# Patient Record
Sex: FEMALE | Race: WHITE | Employment: OTHER | URBAN - METROPOLITAN AREA
[De-identification: names, ages, dates, MRNs, and addresses within clinical notes are randomized per-mention and may not be internally consistent; named-entity substitution may affect disease eponyms.]

---

## 2017-02-06 ENCOUNTER — OFFICE VISIT (OUTPATIENT)
Dept: CARDIOLOGY CLINIC | Age: 82
End: 2017-02-06

## 2017-02-06 VITALS
SYSTOLIC BLOOD PRESSURE: 130 MMHG | HEIGHT: 62 IN | HEART RATE: 52 BPM | OXYGEN SATURATION: 95 % | BODY MASS INDEX: 42.42 KG/M2 | DIASTOLIC BLOOD PRESSURE: 80 MMHG | WEIGHT: 230.5 LBS

## 2017-02-06 DIAGNOSIS — I25.10 CORONARY ARTERY DISEASE DUE TO LIPID RICH PLAQUE: Primary | ICD-10-CM

## 2017-02-06 DIAGNOSIS — Z95.1 S/P CABG X 3: ICD-10-CM

## 2017-02-06 DIAGNOSIS — I65.23 BILATERAL CAROTID ARTERY STENOSIS: ICD-10-CM

## 2017-02-06 DIAGNOSIS — E78.49 OTHER HYPERLIPIDEMIA: ICD-10-CM

## 2017-02-06 DIAGNOSIS — I25.83 CORONARY ARTERY DISEASE DUE TO LIPID RICH PLAQUE: Primary | ICD-10-CM

## 2017-02-06 PROCEDURE — 99214 OFFICE O/P EST MOD 30 MIN: CPT | Performed by: INTERNAL MEDICINE

## 2017-02-06 RX ORDER — LISINOPRIL 20 MG/1
20 TABLET ORAL DAILY
COMMUNITY
Start: 2017-01-26 | End: 2017-02-06 | Stop reason: SDUPTHER

## 2017-02-06 RX ORDER — LEVOTHYROXINE SODIUM 0.12 MG/1
125 TABLET ORAL DAILY
COMMUNITY
Start: 2017-01-12

## 2017-02-07 RX ORDER — LISINOPRIL 20 MG/1
20 TABLET ORAL DAILY
Qty: 90 TABLET | Refills: 3 | Status: SHIPPED | OUTPATIENT
Start: 2017-02-07 | End: 2018-02-13 | Stop reason: SDUPTHER

## 2017-08-18 ENCOUNTER — OFFICE VISIT (OUTPATIENT)
Dept: CARDIOLOGY CLINIC | Age: 82
End: 2017-08-18

## 2017-08-18 VITALS
DIASTOLIC BLOOD PRESSURE: 70 MMHG | BODY MASS INDEX: 42.88 KG/M2 | SYSTOLIC BLOOD PRESSURE: 138 MMHG | WEIGHT: 233 LBS | HEART RATE: 56 BPM | HEIGHT: 62 IN

## 2017-08-18 DIAGNOSIS — Z95.1 S/P CABG X 3: ICD-10-CM

## 2017-08-18 DIAGNOSIS — E78.5 HYPERLIPIDEMIA, UNSPECIFIED HYPERLIPIDEMIA TYPE: Chronic | ICD-10-CM

## 2017-08-18 DIAGNOSIS — I25.10 CORONARY ARTERY DISEASE DUE TO LIPID RICH PLAQUE: Primary | ICD-10-CM

## 2017-08-18 DIAGNOSIS — R42 DIZZINESS: ICD-10-CM

## 2017-08-18 DIAGNOSIS — I65.23 BILATERAL CAROTID ARTERY STENOSIS: Chronic | ICD-10-CM

## 2017-08-18 DIAGNOSIS — I25.83 CORONARY ARTERY DISEASE DUE TO LIPID RICH PLAQUE: Primary | ICD-10-CM

## 2017-08-18 PROCEDURE — 99213 OFFICE O/P EST LOW 20 MIN: CPT | Performed by: INTERNAL MEDICINE

## 2017-11-13 RX ORDER — PRAVASTATIN SODIUM 40 MG
40 TABLET ORAL DAILY
Qty: 90 TABLET | Refills: 3 | Status: SHIPPED | OUTPATIENT
Start: 2017-11-13 | End: 2018-11-25 | Stop reason: SDUPTHER

## 2018-02-13 RX ORDER — LISINOPRIL 20 MG/1
20 TABLET ORAL DAILY
Qty: 90 TABLET | Refills: 4 | Status: SHIPPED | OUTPATIENT
Start: 2018-02-13 | End: 2018-08-09 | Stop reason: SDUPTHER

## 2018-08-06 PROBLEM — I10 ESSENTIAL HYPERTENSION: Status: ACTIVE | Noted: 2018-08-06

## 2018-08-07 NOTE — PROGRESS NOTES
[address]   Cardiac Followup    Referring Provider:  Mirian Metz MD     Chief Complaint   Patient presents with    Coronary Artery Disease    Chest Pain     Pt states she has had CP on and off for past few months. She hasn't had it lately. History of Present Illness:  Ms. Precious Vaz is a 81 y. o.female being seen in follow up for CAD s/p CABG 9/19/16. (LIMA to LAD, SVG to OM, and SVG to PDA). Additional history includes hypertension, hyperlidemia, and carotid stenosis. She had MRA of the neck that revealed 80% JAVIER. In December, 2013 a carotid doppler revealed 50-70% JAVIER. Today, she admits she is not as active due to balance issues, hip pain,  and the fact that her friend passed away who she did activities with. She walks with a cane. She also has light headedness with change in position. She had a few episodes of mid-sternal to left sided chest discomfort which radiated to back, non-exertional, no associated symptoms, often occurs after eating. Her biggest problem is fatigue. She gets short of breath when walking longer distances. The farthest she walks is to her barn. Past Medical History:   has a past medical history of Arthritis; Blood transfusion reaction; Carotid artery stenosis; Coronary artery disease due to lipid rich plaque; Hyperlipidemia; Hypertension; Nausea & vomiting; and Thyroid disease. Surgical History:   has a past surgical history that includes Tonsillectomy; hernia repair; Tubal ligation; Carotid endarterectomy (Right, 07/15/2014); Hysterectomy; Total knee arthroplasty (Bilateral); Total hip arthroplasty (Right); Cardiac catheterization (09/16/2016); and Coronary artery bypass graft (09/19/2016). Social History:   reports that she has never smoked. She has never used smokeless tobacco. She reports that she does not drink alcohol. Family History:  family history includes Heart Disease in her brother and mother.      Home Medications:  Prior to Admission medications    Medication Sig Start Date End Date Taking? Authorizing Provider   atenolol (TENORMIN) 25 MG tablet Take 25 mg by mouth daily   Yes Historical Provider, MD   aspirin 81 MG tablet Take 81 mg by mouth daily   Yes Historical Provider, MD   lisinopril (PRINIVIL;ZESTRIL) 10 MG tablet Take 1 tablet by mouth daily 8/9/18  Yes Sirisha Sandhu MD   pravastatin (PRAVACHOL) 40 MG tablet TAKE 1 TABLET BY MOUTH  DAILY 11/13/17  Yes Sirisha Sandhu MD   levothyroxine (SYNTHROID) 125 MCG tablet Take 125 mcg by mouth daily  1/12/17  Yes Historical Provider, MD   docusate sodium (COLACE, DULCOLAX) 100 MG CAPS Take 100 mg by mouth 2 times daily  Patient taking differently: Take 100 mg by mouth daily  9/23/16  Yes ISIDORO Wu CNP   furosemide (LASIX) 40 MG tablet Take 1 tablet by mouth daily. 7/8/14  Yes Melani Freire MD      Allergies:  Patient has no known allergies. Review of Systems:   A complete review of systems has been reviewed and updated today and is negative except as noted in the history of present illness. Physical Examination:    Vitals:    08/09/18 1010   BP: 126/72   Pulse: 62     Constitutional and General Appearance: NAD   Respiratory:  · Normal excursion and expansion without use of accessory muscles  · Resp Auscultation: Normal breath sounds without dullness  Cardiovascular:  · The apical impulses not displaced  · JVD is normal  · The carotid upstroke is normal in amplitude and contour without delay or bruit  · Normal S1S2, No S3, No Murmur  · Peripheral pulses are symmetrical and full  · There is no clubbing, cyanosis of the extremities. · Trace LE edema sukuamr.   · Pedal Pulses: 2+ and equal   Abdomen:  · No masses or tenderness  · Liver/Spleen: No Abnormalities Noted  Neurological/Psychiatric:  · Alert and oriented in all spheres  · Moves all extremities well  · Exhibits normal gait balance and coordination  · No abnormalities of mood, affect, memory, mentation, or

## 2018-08-09 ENCOUNTER — OFFICE VISIT (OUTPATIENT)
Dept: CARDIOLOGY CLINIC | Age: 83
End: 2018-08-09

## 2018-08-09 VITALS
WEIGHT: 234 LBS | SYSTOLIC BLOOD PRESSURE: 126 MMHG | HEART RATE: 62 BPM | HEIGHT: 62 IN | DIASTOLIC BLOOD PRESSURE: 72 MMHG | BODY MASS INDEX: 43.06 KG/M2

## 2018-08-09 DIAGNOSIS — I25.83 CORONARY ARTERY DISEASE DUE TO LIPID RICH PLAQUE: Primary | ICD-10-CM

## 2018-08-09 DIAGNOSIS — Z95.1 S/P CABG X 3: ICD-10-CM

## 2018-08-09 DIAGNOSIS — I25.10 CORONARY ARTERY DISEASE DUE TO LIPID RICH PLAQUE: Primary | ICD-10-CM

## 2018-08-09 DIAGNOSIS — I10 ESSENTIAL HYPERTENSION: ICD-10-CM

## 2018-08-09 DIAGNOSIS — I65.23 BILATERAL CAROTID ARTERY STENOSIS: Chronic | ICD-10-CM

## 2018-08-09 DIAGNOSIS — E78.5 DYSLIPIDEMIA: ICD-10-CM

## 2018-08-09 PROCEDURE — 1090F PRES/ABSN URINE INCON ASSESS: CPT | Performed by: INTERNAL MEDICINE

## 2018-08-09 PROCEDURE — 1123F ACP DISCUSS/DSCN MKR DOCD: CPT | Performed by: INTERNAL MEDICINE

## 2018-08-09 PROCEDURE — 1101F PT FALLS ASSESS-DOCD LE1/YR: CPT | Performed by: INTERNAL MEDICINE

## 2018-08-09 PROCEDURE — 99214 OFFICE O/P EST MOD 30 MIN: CPT | Performed by: INTERNAL MEDICINE

## 2018-08-09 PROCEDURE — 1036F TOBACCO NON-USER: CPT | Performed by: INTERNAL MEDICINE

## 2018-08-09 PROCEDURE — G8400 PT W/DXA NO RESULTS DOC: HCPCS | Performed by: INTERNAL MEDICINE

## 2018-08-09 PROCEDURE — G8427 DOCREV CUR MEDS BY ELIG CLIN: HCPCS | Performed by: INTERNAL MEDICINE

## 2018-08-09 PROCEDURE — 4040F PNEUMOC VAC/ADMIN/RCVD: CPT | Performed by: INTERNAL MEDICINE

## 2018-08-09 PROCEDURE — G8598 ASA/ANTIPLAT THER USED: HCPCS | Performed by: INTERNAL MEDICINE

## 2018-08-09 PROCEDURE — G8417 CALC BMI ABV UP PARAM F/U: HCPCS | Performed by: INTERNAL MEDICINE

## 2018-08-09 RX ORDER — LISINOPRIL 10 MG/1
10 TABLET ORAL DAILY
Qty: 30 TABLET | Refills: 11
Start: 2018-08-09 | End: 2019-06-18

## 2018-08-09 RX ORDER — ATENOLOL 25 MG/1
50 TABLET ORAL DAILY
COMMUNITY
End: 2020-07-10

## 2018-08-09 NOTE — LETTER
Tubal ligation; Carotid endarterectomy (Right, 07/15/2014); Hysterectomy; Total knee arthroplasty (Bilateral); Total hip arthroplasty (Right); Cardiac catheterization (09/16/2016); and Coronary artery bypass graft (09/19/2016). Social History:   reports that she has never smoked. She has never used smokeless tobacco. She reports that she does not drink alcohol. Family History:  family history includes Heart Disease in her brother and mother. Home Medications:  Prior to Admission medications    Medication Sig Start Date End Date Taking? Authorizing Provider   atenolol (TENORMIN) 25 MG tablet Take 25 mg by mouth daily   Yes Historical Provider, MD   aspirin 81 MG tablet Take 81 mg by mouth daily   Yes Historical Provider, MD   lisinopril (PRINIVIL;ZESTRIL) 10 MG tablet Take 1 tablet by mouth daily 8/9/18  Yes Dean Teixeira MD   pravastatin (PRAVACHOL) 40 MG tablet TAKE 1 TABLET BY MOUTH  DAILY 11/13/17  Yes Dean Teixeira MD   levothyroxine (SYNTHROID) 125 MCG tablet Take 125 mcg by mouth daily  1/12/17  Yes Historical Provider, MD   docusate sodium (COLACE, DULCOLAX) 100 MG CAPS Take 100 mg by mouth 2 times daily  Patient taking differently: Take 100 mg by mouth daily  9/23/16  Yes ISIDORO Mcgill CNP   furosemide (LASIX) 40 MG tablet Take 1 tablet by mouth daily. 7/8/14  Yes Dylan Hamilton MD      Allergies:  Patient has no known allergies. Review of Systems:   A complete review of systems has been reviewed and updated today and is negative except as noted in the history of present illness.       Physical Examination:    Vitals:    08/09/18 1010   BP: 126/72   Pulse: 62     Constitutional and General Appearance: NAD   Respiratory:  · Normal excursion and expansion without use of accessory muscles  · Resp Auscultation: Normal breath sounds without dullness  Cardiovascular:  · The apical impulses not displaced  · JVD is normal · The carotid upstroke is normal in amplitude and contour without delay or bruit  · Normal S1S2, No S3, No Murmur  · Peripheral pulses are symmetrical and full  · There is no clubbing, cyanosis of the extremities. · Trace LE edema sukumar. · Pedal Pulses: 2+ and equal   Abdomen:  · No masses or tenderness  · Liver/Spleen: No Abnormalities Noted  Neurological/Psychiatric:  · Alert and oriented in all spheres  · Moves all extremities well  · Exhibits normal gait balance and coordination  · No abnormalities of mood, affect, memory, mentation, or behavior are noted      Carotid ultrasound 10/24/17:  Conclusions:  Estimated diameter reduction of the right internal carotid artery and  bulb  is <50%. Estimated diameter reduction of the left internal carotid artery is 50  -69%. Antegrade right vertebral artery flow. Antegrade left vertebral artery  flow. Degree of stenosis derived using validated velocity measurements  correlated with NASCET validated angiographic criteria. CAB16:  Coronary artery bypass grafting x3 (LIMA to LAD, SVG to OM, SVG to PDA).     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 Severe 3V CAD  2. Normal LV systolic function  3. Marked sinus bradycardia      Carotid ultrasound 16:  The left internal carotid artery appears to have a lower end 50-79% diameter reducing stenosis based on velocity criteria. The right internal carotid artery appears to have a 1-15% diameter reducing stenosis based on velocity criteria.     Assessment:    Diagnosis Orders   1. Coronary artery disease due to lipid rich plaque     2. S/P CABG x 3     3. Dyslipidemia     4. Essential hypertension     5.  Bilateral carotid artery stenosis  VL Carotid Bilateral

## 2018-08-14 NOTE — COMMUNICATION BODY
behavior are noted      Carotid ultrasound 10/24/17:  Conclusions:  Estimated diameter reduction of the right internal carotid artery and  bulb  is <50%. Estimated diameter reduction of the left internal carotid artery is 50  -69%. Antegrade right vertebral artery flow. Antegrade left vertebral artery  flow. Degree of stenosis derived using validated velocity measurements  correlated with NASCET validated angiographic criteria. CAB16:  Coronary artery bypass grafting x3 (LIMA to LAD, SVG to OM, SVG to PDA).     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 Severe 3V CAD  2. Normal LV systolic function  3. Marked sinus bradycardia      Carotid ultrasound 16:  The left internal carotid artery appears to have a lower end 50-79% diameter reducing stenosis based on velocity criteria. The right internal carotid artery appears to have a 1-15% diameter reducing stenosis based on velocity criteria.     Assessment:    Diagnosis Orders   1. Coronary artery disease due to lipid rich plaque     2. S/P CABG x 3     3. Dyslipidemia     4. Essential hypertension     5. Bilateral carotid artery stenosis  VL Carotid Bilateral     Coronary artery disease  S/p CABG x 3 (16)  Assessment : treated with ASA, Atenolol, Pravastatin. Had several episodes of chest discomfort, nonexertional, often occurs after eating which is atypical for chest pain. Plan :  No change in medication. Monitor for exertional chest pain. Try to become more active. Dyslipidemia  Assessment : 18 TC- 164, TG- 115, HDL- 50, LDL- 91. Liver enzymes normal.  Pravastatin 40 mg.  Reviewed all recent labs. Plan : no change in medications.       Hypertension  Assessment : Blood pressure 126/72,

## 2018-10-26 ENCOUNTER — TELEPHONE (OUTPATIENT)
Dept: CARDIOLOGY CLINIC | Age: 83
End: 2018-10-26

## 2018-10-26 NOTE — TELEPHONE ENCOUNTER
Pt calling needs to have her order for VL Carotid Bilateral faxed to [facility] [phone] so she can get this done.  Pls call to advise Thank you

## 2018-11-15 ENCOUNTER — TELEPHONE (OUTPATIENT)
Dept: CARDIOLOGY CLINIC | Age: 83
End: 2018-11-15

## 2018-11-15 NOTE — TELEPHONE ENCOUNTER
Patient notified that results of carotid duplex show no significant change from last test > mild plaque in JAVIER and moderate plaque in LICA. Will recheck in six months. We can order and schedule test for late April - early May 2019 at office visit in March 2019  [facility] office). Patient verbalized understanding of info discussed.

## 2018-11-26 RX ORDER — PRAVASTATIN SODIUM 40 MG
40 TABLET ORAL DAILY
Qty: 90 TABLET | Refills: 1 | Status: SHIPPED | OUTPATIENT
Start: 2018-11-26 | End: 2019-06-07 | Stop reason: SDUPTHER

## 2019-04-23 RX ORDER — LISINOPRIL 20 MG/1
TABLET ORAL
Qty: 90 TABLET | Refills: 0 | Status: SHIPPED | OUTPATIENT
Start: 2019-04-23 | End: 2020-07-10

## 2019-06-10 ENCOUNTER — TELEPHONE (OUTPATIENT)
Dept: CARDIOLOGY CLINIC | Age: 84
End: 2019-06-10

## 2019-06-10 RX ORDER — PRAVASTATIN SODIUM 40 MG
40 TABLET ORAL DAILY
Qty: 90 TABLET | Refills: 0 | Status: SHIPPED | OUTPATIENT
Start: 2019-06-10 | End: 2020-07-10 | Stop reason: ALTCHOICE

## 2019-06-10 NOTE — TELEPHONE ENCOUNTER
Spoke to the pt-the pain is a dull ache, on and off, left side. She is more short of breath in the past two months. Uses a walker to get around. Still lives alone, drives short distances.

## 2019-06-12 NOTE — PROGRESS NOTES
[address]    2019    Mariela Range (:  1935) is a 81 y.o. female who is here for management of coronary artery disease, modifiable risk factors, and to discuss chest pain. Referring Provider: Geraldean Baumgarten THOMSON, DO    HISTORY:  Ms. Michael Rutherford has a history of CAD s/p CABG 16. (LIMA to LAD, SVG to OM, and SVG to PDA). Additional history includes hypertension, hyperlidemia, and carotid stenosis. She had MRA of the neck that revealed 80% JAVIER. In  a carotid doppler revealed 50-70% JAVIER. At her 2018 visit, she reported atypical chest pain with radiation to back, nonexertional, and occurred often after eating. Today, patient describes mid-sternal chest pain, nonexertional, radiates to back, associated with left arm pain but no complaints of nausea, diaphoresis, or worsening shortness of breath. She has chronic shortness of breath which has not changed since her last visit. She is more fatigue. Her symptoms of fatigue and shortness of breath are similar to those she had prior to CABG. 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 Patient is compliant with medications for CAD, HTN, HLD  and is tolerating them well without side effects. REVIEW OF SYSTEMS:  A complete review of systems was reviewed and is negative except as noted in the history of present illness. Prior to Visit Medications    Medication Sig Taking?  Authorizing Provider   pravastatin (PRAVACHOL) 40 MG tablet TAKE 1 TABLET BY MOUTH  DAILY Yes Panchito Jones MD   lisinopril (PRINIVIL;ZESTRIL) 20 MG tablet TAKE 1 TABLET BY MOUTH  DAILY  Patient taking differently: TAKE 1/2 tab  TABLET BY MOUTH  DAILY Yes Panchito Jones MD   atenolol (TENORMIN) 25 MG tablet Take 50 mg by mouth daily 1/2 of a 50 mg tab Yes Historical Provider, MD   aspirin 81 MG tablet Take 81 mg by mouth daily Yes Historical Provider, MD   levothyroxine (SYNTHROID) 125 MCG tablet Take 125 mcg by mouth daily  Yes Historical Provider, MD   furosemide (LASIX) 40 MG tablet Take 1 tablet by mouth daily. Yes Sylvain Vences MD   docusate sodium (COLACE, DULCOLAX) 100 MG CAPS Take 100 mg by mouth 2 times daily  Patient taking differently: Take 100 mg by mouth daily   Juanita Campbell, APRN - CNP        No Known Allergies    Past Medical History:   Diagnosis Date    Arthritis     Blood transfusion reaction     Carotid artery stenosis     Coronary artery disease due to lipid rich plaque 9/16/2016    Hyperlipidemia     Hypertension     Nausea & vomiting     Thyroid disease     hypothyroid       Past Surgical History:   Procedure Laterality Date    CARDIAC CATHETERIZATION  09/16/2016    Dr. Katie Stone. [place] - severe 3 vessel CAD, sm non-flow limiting dissection in R EIA    CAROTID ENDARTERECTOMY Right 07/15/2014    Dr. Tavares Mis - w/patch angioplasty    CORONARY ARTERY BYPASS GRAFT  09/19/2016    [id number]    HERNIA REPAIR      umbilical    HYSTERECTOMY      TONSILLECTOMY      TOTAL HIP ARTHROPLASTY Right     TOTAL KNEE ARTHROPLASTY Bilateral     TUBAL LIGATION      done with hernia repair       Social History     Tobacco Use    Smoking status: Never Smoker    Smokeless tobacco: Never Used   Substance Use Topics    Alcohol use: No        Family History   Problem Relation Age of Onset    Heart Disease Mother         \"leaking valve\" and mild MI     Heart Disease Brother        PHYSICAL EXAMINATION:  Vitals:    06/18/19 1239   BP: 126/70   Site: Right Lower Arm   Position: Sitting   Cuff Size: Medium Adult   Pulse: 51   SpO2: 95%   Weight: 236 lb (107 kg)   Height: 5' 2\" (1.575 m)     Estimated body mass index is 43.16 kg/m² as calculated from the following:    Height as of this encounter: 5' 2\" (1.575 m). Weight as of this encounter: 236 lb (107 kg).     General Appearance: No apparent distress, obese   Eyes:  · Conjunctiva clear  · Pupils equal, round, reactive to light  ENT:  · External Ears and Nose unremarkable  · Oral mucosa is moist  Respiratory:  · Normal excursion and expansion without use of accessory muscles  · Resp Auscultation: Normal breath sounds without dullness  Cardiovascular:  · JVD is normal  · The carotid upstroke is normal in amplitude and contour without delay or bruit  · Apical impulse is not displaced  · Normal S1, S2. No S3. No Murmur. Regular rate and rhythm  · No edema  · Pedal Pulses: 2+ and equal   Abdomen:  · No masses or tenderness  · Liver/Spleen: No Abnormalities Noted  Musculoskeletal:  · Fingers without clubbing or cyanosis  · Normal Gait  Skin:  · No rash  · Normal skin turgor   Neurologic/Psychiatric:  · Alert and oriented in all spheres  · Normal mood and affect  · Memory and mentation intact      I have reviewed all pertinent lab results and diagnostic testing. Lab Results   Component Value Date    CHOL 145 09/16/2016    TRIG 70 09/16/2016    HDL 42 09/16/2016    LDLCALC 89 09/16/2016     Lab Results   Component Value Date     09/23/2016    K 4.3 09/23/2016    CL 92 09/23/2016    CO2 31 09/23/2016    GLUCOSE 114 09/23/2016    BUN 25 09/23/2016    CREATININE 0.8 09/23/2016    CALCIUM 8.1 09/23/2016    GFRAA >60 09/23/2016     Lab Results   Component Value Date    ALT 11 09/16/2016    AST 15 09/16/2016     Carotid duplex 10/30/18:  Conclusions:  Estimated diameter reduction of the right internal carotid artery and  bulb  is <50%. Estimated diameter reduction of the left internal carotid artery is 50  -69%. Antegrade right vertebral artery flow. Degree of stenosis derived using validated velocity measurements  correlated with NASCET validated angiographic criteria. Echo 6/28/18:  Summary:  Overall left ventricular ejection fraction is estimated to be 60-65%. Left ventricular systolic function is normal. (LVEF>/=55%)  There is mild concentric left ventricular hypertrophy.   The left ventricular wall motion is normal.  The left atrium is mildly dilated. The Aortic Valve leaflets appear sclerotic. Carotid ultrasound 10/24/17:  Conclusions:  Estimated diameter reduction of the right internal carotid artery and  bulb  is <50%. Estimated diameter reduction of the left internal carotid artery is 50  -69%. Antegrade right vertebral artery flow. Antegrade left vertebral artery  flow. Degree of stenosis derived using validated velocity measurements  correlated with NASCET validated angiographic criteria.     CAB16:  Coronary artery bypass grafting x3 (LIMA to LAD, SVG to OM, SVG to PDA).     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 Severe 3V CAD  2. Normal LV systolic function  3. Marked sinus bradycardia      Carotid ultrasound 16:  The left internal carotid artery appears to have a lower end 50-79% diameter reducing stenosis based on velocity criteria. The right internal carotid artery appears to have a 1-15% diameter reducing stenosis based on velocity criteria. ASSESSMENT/PLAN:  1. Chest pain/Shortness of breath  ~ has chronic shortness of breath which has not changed. ~ has atypical chest discomfort which is not related to exertion, but fatigue and shortness of breath are similar to sx she had prior to CABG    Plan > discussed diagnostic testing to evaluate symptoms. Will order echo and Lexiscan myoview to evaluate for coronary ischemia, heart structures and function. Patient is agreeable. 2. Coronary artery disease due to lipid rich plaque  3. S/P CABG x 3 (16)  ~ 2018 Echo - EF 60-65% with normal wall motion  ~ tx with ASA, Atenolol, Pravastatin. Plan > echo and Lexiscan myoview. 4. Essential hypertension  ~ tx with Lisinopril and Atenolol. Also on furosemide. 6/4/19 labs - K+ 5.2, BUN- 25, Creat - 0.89  ~ Blood pressure 126/70, pulse 51, height 5' 2\" (1.575 m), weight 236 lb (107 kg), SpO2 95 %, not currently breastfeeding. Plan > stable, continue current medications    5. Dyslipidemia  ~ 6/4/19 -- TC- 165, TG- 150, HDL- 55, LDL- 80. Tx with Pravastatin 40 mg     Plan > stable on current dose of Pravastatin    6. Bilateral carotid artery stenosis  ~ 10/30/18 carotid duplex showing JAVIER < 96%, LICA 47-62%. Results unchanged from 10/2017 duplex. Plan > repeat carotid duplex soon      Plan:   1.  Schedule echocardiogram and Lexiscan myoview at [facility]  2.  Schedule carotid duplex - patient will schedule procedure herself at Dr. Dia Shin office  3. No change in medication  4. Follow up in 3-4 months       Scribe's attestation: This note was scribed in the presence of Rabia Hermosillo M.D. by Bernadine Fuller RN    Physician Attestation: The scribe's documentation has been prepared under my direction and personally reviewed by me in its entirety. I confirm that the note above accurately reflects all work, treatment, procedures, and medical decision making performed by me. An  electronic signature was used to authenticate this note. Mayte Espinal MD, [facility] [place], [address]

## 2019-06-18 ENCOUNTER — OFFICE VISIT (OUTPATIENT)
Dept: CARDIOLOGY CLINIC | Age: 84
End: 2019-06-18
Payer: MEDICARE

## 2019-06-18 VITALS
BODY MASS INDEX: 43.43 KG/M2 | DIASTOLIC BLOOD PRESSURE: 70 MMHG | HEIGHT: 62 IN | OXYGEN SATURATION: 95 % | WEIGHT: 236 LBS | SYSTOLIC BLOOD PRESSURE: 126 MMHG | HEART RATE: 51 BPM

## 2019-06-18 DIAGNOSIS — I10 ESSENTIAL HYPERTENSION: ICD-10-CM

## 2019-06-18 DIAGNOSIS — I25.83 CORONARY ARTERY DISEASE DUE TO LIPID RICH PLAQUE: ICD-10-CM

## 2019-06-18 DIAGNOSIS — R06.02 SHORTNESS OF BREATH: ICD-10-CM

## 2019-06-18 DIAGNOSIS — E78.5 DYSLIPIDEMIA: ICD-10-CM

## 2019-06-18 DIAGNOSIS — I25.10 CORONARY ARTERY DISEASE DUE TO LIPID RICH PLAQUE: ICD-10-CM

## 2019-06-18 DIAGNOSIS — Z95.1 S/P CABG X 3: ICD-10-CM

## 2019-06-18 DIAGNOSIS — I65.23 BILATERAL CAROTID ARTERY STENOSIS: Chronic | ICD-10-CM

## 2019-06-18 DIAGNOSIS — R07.2 PRECORDIAL PAIN: Primary | ICD-10-CM

## 2019-06-18 PROCEDURE — G8417 CALC BMI ABV UP PARAM F/U: HCPCS | Performed by: INTERNAL MEDICINE

## 2019-06-18 PROCEDURE — G8598 ASA/ANTIPLAT THER USED: HCPCS | Performed by: INTERNAL MEDICINE

## 2019-06-18 PROCEDURE — 1123F ACP DISCUSS/DSCN MKR DOCD: CPT | Performed by: INTERNAL MEDICINE

## 2019-06-18 PROCEDURE — G8427 DOCREV CUR MEDS BY ELIG CLIN: HCPCS | Performed by: INTERNAL MEDICINE

## 2019-06-18 PROCEDURE — 4040F PNEUMOC VAC/ADMIN/RCVD: CPT | Performed by: INTERNAL MEDICINE

## 2019-06-18 PROCEDURE — 1090F PRES/ABSN URINE INCON ASSESS: CPT | Performed by: INTERNAL MEDICINE

## 2019-06-18 PROCEDURE — 1036F TOBACCO NON-USER: CPT | Performed by: INTERNAL MEDICINE

## 2019-06-18 PROCEDURE — G8400 PT W/DXA NO RESULTS DOC: HCPCS | Performed by: INTERNAL MEDICINE

## 2019-06-18 PROCEDURE — 99213 OFFICE O/P EST LOW 20 MIN: CPT | Performed by: INTERNAL MEDICINE

## 2019-06-18 NOTE — LETTER
[address] Cardiology [address] [address]  Phone: [phone]  Fax: [phone]    Antonio Stark MD        2019     Anastacio Morillo, [address] [address]    Patient: Yumiko Renee  MR Number: [id number]  YOB: 1935  Date of Visit: 2019    Dear Dr. Anastacio Morillo:    Below are the relevant portions of my assessment and plan of care. [address]    2019    Yumiko Renee (:  1935) is a 81 y.o. female who is here for management of coronary artery disease, modifiable risk factors, and to discuss chest pain. Referring Provider: Michael Bruner DO    HISTORY:  Ms. Saeed Crump has a history of CAD s/p CABG 16. (LIMA to LAD, SVG to OM, and SVG to PDA). Additional history includes hypertension, hyperlidemia, and carotid stenosis. She had MRA of the neck that revealed 80% JAVIER. In  a carotid doppler revealed 50-70% JAVIER. At her 2018 visit, she reported atypical chest pain with radiation to back, nonexertional, and occurred often after eating. Today, patient describes mid-sternal chest pain, nonexertional, radiates to back, associated with left arm pain but no complaints of nausea, diaphoresis, or worsening shortness of breath. She has chronic shortness of breath which has not changed since her last visit. She is more fatigue. Her symptoms of fatigue and shortness of breath are similar to those she had prior to CABG. 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 Patient is compliant with medications for CAD, HTN, HLD  and is tolerating them well without side effects. REVIEW OF SYSTEMS:  A complete review of systems was reviewed and is negative except as noted in the history of present illness. Prior to Visit Medications    Medication Sig Taking?  Authorizing Provider 06/18/19 1239   BP: 126/70   Site: Right Lower Arm   Position: Sitting   Cuff Size: Medium Adult   Pulse: 51   SpO2: 95%   Weight: 236 lb (107 kg)   Height: 5' 2\" (1.575 m)     Estimated body mass index is 43.16 kg/m² as calculated from the following:    Height as of this encounter: 5' 2\" (1.575 m). Weight as of this encounter: 236 lb (107 kg). General Appearance: No apparent distress, obese   Eyes:  · Conjunctiva clear  · Pupils equal, round, reactive to light  ENT:  · External Ears and Nose unremarkable  · Oral mucosa is moist  Respiratory:  · Normal excursion and expansion without use of accessory muscles  · Resp Auscultation: Normal breath sounds without dullness  Cardiovascular:  · JVD is normal  · The carotid upstroke is normal in amplitude and contour without delay or bruit  · Apical impulse is not displaced  · Normal S1, S2. No S3. No Murmur. Regular rate and rhythm  · No edema  · Pedal Pulses: 2+ and equal   Abdomen:  · No masses or tenderness  · Liver/Spleen: No Abnormalities Noted  Musculoskeletal:  · Fingers without clubbing or cyanosis  · Normal Gait  Skin:  · No rash  · Normal skin turgor   Neurologic/Psychiatric:  · Alert and oriented in all spheres  · Normal mood and affect  · Memory and mentation intact      I have reviewed all pertinent lab results and diagnostic testing. Lab Results   Component Value Date    CHOL 145 09/16/2016    TRIG 70 09/16/2016    HDL 42 09/16/2016    LDLCALC 89 09/16/2016     Lab Results   Component Value Date     09/23/2016    K 4.3 09/23/2016    CL 92 09/23/2016    CO2 31 09/23/2016    GLUCOSE 114 09/23/2016    BUN 25 09/23/2016    CREATININE 0.8 09/23/2016    CALCIUM 8.1 09/23/2016    GFRAA >60 09/23/2016     Lab Results   Component Value Date    ALT 11 09/16/2016    AST 15 09/16/2016     Carotid duplex 10/30/18:  Conclusions:  Estimated diameter reduction of the right internal carotid artery and  bulb  is <50%. Estimated diameter reduction of the left internal carotid artery is 50  -69%. Antegrade right vertebral artery flow. Degree of stenosis derived using validated velocity measurements  correlated with NASCET validated angiographic criteria. Echo 18:  Summary:  Overall left ventricular ejection fraction is estimated to be 60-65%. Left ventricular systolic function is normal. (LVEF>/=55%)  There is mild concentric left ventricular hypertrophy. The left ventricular wall motion is normal.  The left atrium is mildly dilated. The Aortic Valve leaflets appear sclerotic. Carotid ultrasound 10/24/17:  Conclusions:  Estimated diameter reduction of the right internal carotid artery and  bulb  is <50%. Estimated diameter reduction of the left internal carotid artery is 50  -69%. Antegrade right vertebral artery flow. Antegrade left vertebral artery  flow. Degree of stenosis derived using validated velocity measurements  correlated with NASCET validated angiographic criteria.     CAB16:  Coronary artery bypass grafting x3 (LIMA to LAD, SVG to OM, SVG to PDA).     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 Severe 3V CAD  2. Normal LV systolic function  3. Marked sinus bradycardia      Carotid ultrasound 16:  The left internal carotid artery appears to have a lower end 50-79% diameter reducing stenosis based on velocity criteria. The right internal carotid artery appears to have a 1-15% diameter reducing stenosis based on velocity criteria. ASSESSMENT/PLAN:  1. Chest pain/Shortness of breath  ~ has chronic shortness of breath which has not changed.     ~ has atypical chest discomfort which is not related to exertion, but Lawanda George MD

## 2019-06-21 ENCOUNTER — TELEPHONE (OUTPATIENT)
Dept: CARDIOLOGY CLINIC | Age: 84
End: 2019-06-21

## 2019-06-21 ENCOUNTER — PROCEDURE VISIT (OUTPATIENT)
Dept: CARDIOLOGY CLINIC | Age: 84
End: 2019-06-21
Payer: MEDICARE

## 2019-06-21 DIAGNOSIS — R06.02 SHORTNESS OF BREATH: ICD-10-CM

## 2019-06-21 DIAGNOSIS — R07.2 PRECORDIAL PAIN: ICD-10-CM

## 2019-06-21 LAB
LV EF: 60 %
LVEF MODALITY: NORMAL

## 2019-06-21 PROCEDURE — 93306 TTE W/DOPPLER COMPLETE: CPT | Performed by: INTERNAL MEDICINE

## 2019-07-08 ENCOUNTER — TELEPHONE (OUTPATIENT)
Dept: CARDIOLOGY CLINIC | Age: 84
End: 2019-07-08

## 2019-07-08 DIAGNOSIS — I65.23 BILATERAL CAROTID ARTERY STENOSIS: Primary | Chronic | ICD-10-CM

## 2019-07-08 NOTE — TELEPHONE ENCOUNTER
Pt calling, she is starting to feel bad again but she canceled the carotid doppler due to being afraid of the dye. She would like to speak to [facility] about having another test. Please call to advise. Thank you.

## 2019-07-15 ENCOUNTER — TELEPHONE (OUTPATIENT)
Dept: CARDIOLOGY CLINIC | Age: 84
End: 2019-07-15

## 2020-07-10 ENCOUNTER — OFFICE VISIT (OUTPATIENT)
Dept: CARDIOLOGY CLINIC | Age: 85
End: 2020-07-10
Payer: MEDICARE

## 2020-07-10 VITALS
BODY MASS INDEX: 43.05 KG/M2 | SYSTOLIC BLOOD PRESSURE: 128 MMHG | TEMPERATURE: 98.6 F | WEIGHT: 228 LBS | HEART RATE: 78 BPM | DIASTOLIC BLOOD PRESSURE: 70 MMHG | HEIGHT: 61 IN

## 2020-07-10 PROCEDURE — 99214 OFFICE O/P EST MOD 30 MIN: CPT | Performed by: INTERNAL MEDICINE

## 2020-07-10 PROCEDURE — G8427 DOCREV CUR MEDS BY ELIG CLIN: HCPCS | Performed by: INTERNAL MEDICINE

## 2020-07-10 PROCEDURE — 1036F TOBACCO NON-USER: CPT | Performed by: INTERNAL MEDICINE

## 2020-07-10 PROCEDURE — 1123F ACP DISCUSS/DSCN MKR DOCD: CPT | Performed by: INTERNAL MEDICINE

## 2020-07-10 PROCEDURE — 4040F PNEUMOC VAC/ADMIN/RCVD: CPT | Performed by: INTERNAL MEDICINE

## 2020-07-10 PROCEDURE — G8417 CALC BMI ABV UP PARAM F/U: HCPCS | Performed by: INTERNAL MEDICINE

## 2020-07-10 PROCEDURE — G8400 PT W/DXA NO RESULTS DOC: HCPCS | Performed by: INTERNAL MEDICINE

## 2020-07-10 PROCEDURE — 1090F PRES/ABSN URINE INCON ASSESS: CPT | Performed by: INTERNAL MEDICINE

## 2020-07-10 RX ORDER — MECLIZINE HCL 12.5 MG/1
12.5 TABLET ORAL 3 TIMES DAILY PRN
COMMUNITY
End: 2021-08-26

## 2020-07-10 RX ORDER — FUROSEMIDE 20 MG/1
40 TABLET ORAL DAILY
Qty: 90 TABLET | Refills: 1 | COMMUNITY
Start: 2020-07-10

## 2020-07-10 RX ORDER — FERROUS SULFATE 325(65) MG
325 TABLET ORAL
COMMUNITY
End: 2021-01-26

## 2020-07-10 RX ORDER — LISINOPRIL 10 MG/1
TABLET ORAL
Qty: 90 TABLET | Refills: 1 | COMMUNITY
Start: 2020-07-10 | End: 2021-04-06

## 2020-07-10 RX ORDER — ATORVASTATIN CALCIUM 20 MG/1
20 TABLET, FILM COATED ORAL DAILY
Qty: 30 TABLET | Refills: 5 | Status: SHIPPED | OUTPATIENT
Start: 2020-07-10 | End: 2020-08-13 | Stop reason: SDUPTHER

## 2020-07-10 NOTE — PROGRESS NOTES
[facility]   Cardiac Consultation    Referring Provider:  Gorge Pierce DO     Chief Complaint   Patient presents with    Follow-up     Former [facility] Pt.  Coronary Artery Disease     Pt states she has pain under her left breast once in awhile.  Hypertension        History of Present Illness:   Ms. Roberta Fox has a history of CAD s/p CABG 9/19/16. (LIMA to LAD, SVG to OM, and SVG to PDA).  Additional history includes hypertension, hyperlidemia, and carotid stenosis. She had MRA of the neck that revealed 80% JAVIER. In December, 2013 a carotid doppler revealed 50-70% JAVIER. At her August 2018 visit, she reported atypical chest pain with radiation to back, nonexertional, and occurred often after eating. Today,  she reports she is short of breath walking short distances, from the bathroom to the kitchen. She has chronic shortness of breath which has not changed since her last visit. She is chronically  Fatigued, states \"I don't do much\" . Patient is compliant with medications and is tolerating them well without side effects. she lives alone. She uses a walker, reports she has  \"balance issues\". Past Medical History:   has a past medical history of Arthritis, Blood transfusion reaction, Carotid artery stenosis, Coronary artery disease due to lipid rich plaque, Hyperlipidemia, Hypertension, Nausea & vomiting, and Thyroid disease. Surgical History:   has a past surgical history that includes Tonsillectomy; hernia repair; Tubal ligation; Carotid endarterectomy (Right, 07/15/2014); Hysterectomy; Total knee arthroplasty (Bilateral); Total hip arthroplasty (Right); Cardiac catheterization (09/16/2016); and Coronary artery bypass graft (09/19/2016). Social History:   reports that she has never smoked. She has never used smokeless tobacco. She reports that she does not drink alcohol. Family History:  family history includes Heart Disease in her brother and mother.      Home Medications:  Prior to Admission medications    Medication Sig Start Date End Date Taking? Authorizing Provider   ferrous sulfate (IRON 325) 325 (65 Fe) MG tablet Take 325 mg by mouth daily (with breakfast)   Yes Historical Provider, MD   meclizine (ANTIVERT) 12.5 MG tablet Take 12.5 mg by mouth 3 times daily as needed   Yes Historical Provider, MD   pravastatin (PRAVACHOL) 40 MG tablet TAKE 1 TABLET BY MOUTH  DAILY 6/10/19  Yes Mario Carranza MD   lisinopril (PRINIVIL;ZESTRIL) 20 MG tablet TAKE 1 TABLET BY MOUTH  DAILY  Patient taking differently: TAKE 1/2 tab  TABLET BY MOUTH  DAILY 4/23/19  Yes Mario Carranza MD   levothyroxine (SYNTHROID) 125 MCG tablet Take 125 mcg by mouth daily  1/12/17  Yes Historical Provider, MD   docusate sodium (COLACE, DULCOLAX) 100 MG CAPS Take 100 mg by mouth 2 times daily  Patient taking differently: Take 100 mg by mouth daily  9/23/16  Yes ISIDORO Young CNP   furosemide (LASIX) 40 MG tablet Take 1 tablet by mouth daily. 7/8/14  Yes Antwan Sánchez MD   atenolol (TENORMIN) 25 MG tablet Take 50 mg by mouth daily 1/2 of a 50 mg tab    Historical Provider, MD   aspirin 81 MG tablet Take 81 mg by mouth daily    Historical Provider, MD        Allergies:  Patient has no known allergies. Review of Systems:   · Constitutional: there has been no unanticipated weight loss. There's been no change in energy level, sleep pattern, or activity level. · Eyes: No visual changes or diplopia. No scleral icterus. · ENT: No Headaches, hearing loss or vertigo. No mouth sores or sore throat. · Cardiovascular: Reviewed in HPI  · Respiratory: No cough or wheezing, no sputum production. No hematemesis. +SOB   · Gastrointestinal: No abdominal pain, appetite loss, blood in stools. No change in bowel or bladder habits. · Genitourinary: No dysuria, trouble voiding, or hematuria. · Musculoskeletal:  No gait disturbance, weakness or joint complaints.  +walker   · Integumentary: No rash or pruritis. · Neurological: No headache, diplopia, change in muscle strength, numbness or tingling. No change in gait, balance, coordination, mood, affect, memory, mentation, behavior. · Psychiatric: No anxiety, no depression. · Endocrine: No malaise, fatigue or temperature intolerance. No excessive thirst, fluid intake, or urination. No tremor. · Hematologic/Lymphatic: No abnormal bruising or bleeding, blood clots or swollen lymph nodes. · Allergic/Immunologic: No nasal congestion or hives. Physical Examination:    Vitals:    07/10/20 1052   BP: 128/70   Pulse: 78   Temp: 98.6 °F (37 °C)        Wt Readings from Last 1 Encounters:   07/10/20 228 lb (103.4 kg)       Constitutional and General Appearance: NAD   Respiratory:  · Normal excursion and expansion without use of accessory muscles  · Resp Auscultation: Normal breath sounds without dullness  Cardiovascular:  · The apical impulses not displaced  · Heart tones are crisp and normal  · Cervical veins are not engorged  · The carotid upstroke is normal in amplitude and contour without delay or bruit  · Peripheral pulses are symmetrical and full  · There is no clubbing, cyanosis of the extremities.   · No edema  · Femoral Arteries: 2+ and equal  · Pedal Pulses: 2+ and equal   Abdomen:  · No masses or tenderness  · Liver/Spleen: No Abnormalities Noted  Neurological/Psychiatric:  · Alert and oriented in all spheres  · Moves all extremities well  · Exhibits normal gait balance and coordination  · No abnormalities of mood, affect, memory, mentation, or behavior are noted      Assessment:    Coronary artery disease due to lipid rich plaque   S/P CABG x 3 (9/19/16)  ~ 6/2018 Echo - EF 60-65% with normal wall motion  ~ 7/2019 Lexiscan myoview>  Apical scar.  No reversible myocardial ischemia.      Essential hypertension  ~ tx with Lisinopril  Also on furosemide.   ~ Blood pressure 128/70, pulse 78, temperature 98.6 °F (37 °C), height 5' 1\" (1.549 m), weight 228 lb

## 2020-08-13 RX ORDER — ATORVASTATIN CALCIUM 20 MG/1
20 TABLET, FILM COATED ORAL DAILY
Qty: 90 TABLET | Refills: 3 | Status: SHIPPED | OUTPATIENT
Start: 2020-08-13 | End: 2021-06-22

## 2020-10-29 ENCOUNTER — TELEPHONE (OUTPATIENT)
Dept: CARDIOLOGY CLINIC | Age: 85
End: 2020-10-29

## 2020-10-29 NOTE — TELEPHONE ENCOUNTER
Patient called to cancel appt for tomorrow. [facility] that she fainted and hit the back of her head on Monday and was taken to [facility]. She was just released last night with a 30 day heart monitor. Wanted to let Dr. Nadya Dubois know since they put a heart monitor on her.

## 2021-01-13 NOTE — PROGRESS NOTES
drink alcohol. Family History:  family history includes Heart Disease in her brother and mother. Home Medications:  Prior to Admission medications    Medication Sig Start Date End Date Taking? Authorizing Provider   atorvastatin (LIPITOR) 20 MG tablet Take 1 tablet by mouth daily 8/13/20  Yes Jim Sotelo MD   meclizine (ANTIVERT) 12.5 MG tablet Take 12.5 mg by mouth 3 times daily as needed   Yes Historical Provider, MD   lisinopril (PRINIVIL;ZESTRIL) 10 MG tablet TAKE 1  TABLET BY MOUTH  DAILY 7/10/20  Yes Jim Sotelo MD   furosemide (LASIX) 20 MG tablet Take 20 mg by mouth daily  7/10/20  Yes Jim Sotelo MD   aspirin 81 MG tablet Take 81 mg by mouth daily   Yes Historical Provider, MD   levothyroxine (SYNTHROID) 125 MCG tablet Take 125 mcg by mouth daily  1/12/17  Yes Historical Provider, MD   docusate sodium (COLACE, DULCOLAX) 100 MG CAPS Take 100 mg by mouth 2 times daily  Patient taking differently: Take 100 mg by mouth daily  9/23/16  Yes ISIDORO Nelson - CNP        Allergies:  Patient has no known allergies. Review of Systems:   · Constitutional: there has been no unanticipated weight loss. There's been no change in energy level, sleep pattern, or activity level. · Eyes: No visual changes or diplopia. No scleral icterus. · ENT: No Headaches, hearing loss or vertigo. No mouth sores or sore throat. · Cardiovascular: Reviewed in HPI  · Respiratory: No cough or wheezing, no sputum production. No hematemesis. +SOB   · Gastrointestinal: No abdominal pain, appetite loss, blood in stools. No change in bowel or bladder habits. · Genitourinary: No dysuria, trouble voiding, or hematuria. · Musculoskeletal:  No gait disturbance, weakness or joint complaints. +walker   · Integumentary: No rash or pruritis. · Neurological: No headache, diplopia, change in muscle strength, numbness or tingling.  No change in gait, balance, coordination, mood, affect, memory, mentation, behavior. · Psychiatric: No anxiety, no depression. · Endocrine: No malaise, fatigue or temperature intolerance. No excessive thirst, fluid intake, or urination. No tremor. · Hematologic/Lymphatic: No abnormal bruising or bleeding, blood clots or swollen lymph nodes. · Allergic/Immunologic: No nasal congestion or hives. Physical Examination:    Vitals:    01/26/21 1120   BP: 134/78   Pulse: 72   Resp: 22   SpO2: 96%        Wt Readings from Last 1 Encounters:   01/26/21 228 lb (103.4 kg)       Constitutional and General Appearance: NAD   Skin:good turgor,intact without lesions  HEENT: EOMI ,normal  Neck:no JVD  Respiratory:  · Normal excursion and expansion without use of accessory muscles  · Resp Auscultation: Normal breath sounds without dullness  Cardiovascular:  · The apical impulses not displaced  · Heart tones are crisp and normal  · Cervical veins are not engorged  · The carotid upstroke is normal in amplitude and contour without delay or bruit  · Peripheral pulses are symmetrical and full  · There is no clubbing, cyanosis of the extremities. · No edema  · Femoral Arteries: 2+ and equal  · Pedal Pulses: 2+ and equal   Abdomen:  · No masses or tenderness  · Liver/Spleen: No Abnormalities Noted  Neurological/Psychiatric:  · Alert and oriented in all spheres  · Moves all extremities well  · Exhibits normal gait balance and coordination  · No abnormalities of mood, affect, memory, mentation, or behavior are noted      Assessment:    Coronary artery disease due to lipid rich plaque   S/P CABG x 3 (9/19/16)  ~ 6/2018 Echo - EF 60-65% with normal wall motion  ~ 7/2019 Lexiscan myoview>  Apical scar.  No reversible myocardial ischemia.      Essential hypertension  ~ slight drop when standing   ~ Blood pressure 134/78, pulse 72, resp. rate 22, height 5' 2\" (1.575 m), weight 228 lb (103.4 kg), SpO2 96 %, not currently breastfeeding.     Plan > stable, continue current medications     Dyslipidemia  6/18/20 -- TC- 173, TG- 117, HDL- 58, LDL- 92. Lipitor 20mg daily       Bilateral carotid artery stenosis  10/27/20>  The bilateral internal carotid arteries reveal evidence of     plaque causing a 16 to 49% stenosis on the right side and 50 to     69% stenosis on the left side. Based on velocities I believe the     stenosis on the left side is at the lower end of this range. 10/30/18 carotid duplex showing JAVIER < 03%, LICA 71-29%. Results unchanged from 10/2017 duplex.    7/16/19 carotid duplex showing JAVIER < 04%, LICA <72%    Plan: Aren Escoto has a stable cardiac status. Cardiac test and lab results personally reviewed by me during this office visit and discussed. Labs soon -slips given   Continue risk factor modifications. Call for any change in symptoms. Return for regular follow up in 6 months. I appreciate the opportunity of cooperating in the care of this individual.    Santiago Chamorro. Whitney Bae M.D., [facility]      Patient's problem list, medications, allergies, past medical, surgical, social and family histories were reviewed and updated as appropriate. Scribe's attestation: This note was scribed in the presence of Dr. Whitney Bae MD, by Angle Cline RN. The scribe's documentation has been prepared under my direction and personally reviewed by me in its entirety. I confirm that the note above accurately reflects all work, treatment, procedures, and medical decision making performed by me.

## 2021-01-26 ENCOUNTER — OFFICE VISIT (OUTPATIENT)
Dept: CARDIOLOGY CLINIC | Age: 86
End: 2021-01-26
Payer: MEDICARE

## 2021-01-26 VITALS
DIASTOLIC BLOOD PRESSURE: 78 MMHG | WEIGHT: 228 LBS | SYSTOLIC BLOOD PRESSURE: 134 MMHG | HEIGHT: 62 IN | BODY MASS INDEX: 41.96 KG/M2 | OXYGEN SATURATION: 96 % | RESPIRATION RATE: 22 BRPM | HEART RATE: 72 BPM

## 2021-01-26 DIAGNOSIS — E78.5 DYSLIPIDEMIA: ICD-10-CM

## 2021-01-26 DIAGNOSIS — I25.83 CORONARY ARTERY DISEASE DUE TO LIPID RICH PLAQUE: Primary | ICD-10-CM

## 2021-01-26 DIAGNOSIS — I10 ESSENTIAL HYPERTENSION: ICD-10-CM

## 2021-01-26 DIAGNOSIS — I25.10 CORONARY ARTERY DISEASE DUE TO LIPID RICH PLAQUE: Primary | ICD-10-CM

## 2021-01-26 DIAGNOSIS — I65.23 BILATERAL CAROTID ARTERY STENOSIS: Chronic | ICD-10-CM

## 2021-01-26 PROCEDURE — 1090F PRES/ABSN URINE INCON ASSESS: CPT | Performed by: INTERNAL MEDICINE

## 2021-01-26 PROCEDURE — 4040F PNEUMOC VAC/ADMIN/RCVD: CPT | Performed by: INTERNAL MEDICINE

## 2021-01-26 PROCEDURE — G8417 CALC BMI ABV UP PARAM F/U: HCPCS | Performed by: INTERNAL MEDICINE

## 2021-01-26 PROCEDURE — 1123F ACP DISCUSS/DSCN MKR DOCD: CPT | Performed by: INTERNAL MEDICINE

## 2021-01-26 PROCEDURE — G8427 DOCREV CUR MEDS BY ELIG CLIN: HCPCS | Performed by: INTERNAL MEDICINE

## 2021-01-26 PROCEDURE — 99214 OFFICE O/P EST MOD 30 MIN: CPT | Performed by: INTERNAL MEDICINE

## 2021-01-26 PROCEDURE — G8400 PT W/DXA NO RESULTS DOC: HCPCS | Performed by: INTERNAL MEDICINE

## 2021-01-26 PROCEDURE — G8484 FLU IMMUNIZE NO ADMIN: HCPCS | Performed by: INTERNAL MEDICINE

## 2021-01-26 PROCEDURE — 1036F TOBACCO NON-USER: CPT | Performed by: INTERNAL MEDICINE

## 2021-06-22 RX ORDER — ATORVASTATIN CALCIUM 20 MG/1
20 TABLET, FILM COATED ORAL DAILY
Qty: 90 TABLET | Refills: 3 | Status: SHIPPED | OUTPATIENT
Start: 2021-06-22 | End: 2021-08-26

## 2021-06-22 NOTE — TELEPHONE ENCOUNTER
Prescription refill    Last OV:1-26-21    Last Refill: 8-13-20     Labs:6-18-20 Lipid- [facility]    Future Appt:7-15-21

## 2021-08-26 ENCOUNTER — OFFICE VISIT (OUTPATIENT)
Dept: CARDIOLOGY CLINIC | Age: 86
End: 2021-08-26
Payer: MEDICARE

## 2021-08-26 VITALS
OXYGEN SATURATION: 96 % | BODY MASS INDEX: 38.98 KG/M2 | DIASTOLIC BLOOD PRESSURE: 74 MMHG | SYSTOLIC BLOOD PRESSURE: 136 MMHG | WEIGHT: 220 LBS | HEART RATE: 66 BPM | HEIGHT: 63 IN

## 2021-08-26 DIAGNOSIS — I25.83 CORONARY ARTERY DISEASE DUE TO LIPID RICH PLAQUE: Primary | ICD-10-CM

## 2021-08-26 DIAGNOSIS — I25.10 CORONARY ARTERY DISEASE DUE TO LIPID RICH PLAQUE: Primary | ICD-10-CM

## 2021-08-26 DIAGNOSIS — I65.23 BILATERAL CAROTID ARTERY STENOSIS: ICD-10-CM

## 2021-08-26 DIAGNOSIS — E78.5 DYSLIPIDEMIA: ICD-10-CM

## 2021-08-26 DIAGNOSIS — I10 ESSENTIAL HYPERTENSION: ICD-10-CM

## 2021-08-26 PROCEDURE — G8417 CALC BMI ABV UP PARAM F/U: HCPCS | Performed by: INTERNAL MEDICINE

## 2021-08-26 PROCEDURE — G8400 PT W/DXA NO RESULTS DOC: HCPCS | Performed by: INTERNAL MEDICINE

## 2021-08-26 PROCEDURE — 4040F PNEUMOC VAC/ADMIN/RCVD: CPT | Performed by: INTERNAL MEDICINE

## 2021-08-26 PROCEDURE — G8427 DOCREV CUR MEDS BY ELIG CLIN: HCPCS | Performed by: INTERNAL MEDICINE

## 2021-08-26 PROCEDURE — 1090F PRES/ABSN URINE INCON ASSESS: CPT | Performed by: INTERNAL MEDICINE

## 2021-08-26 PROCEDURE — 99214 OFFICE O/P EST MOD 30 MIN: CPT | Performed by: INTERNAL MEDICINE

## 2021-08-26 PROCEDURE — 1123F ACP DISCUSS/DSCN MKR DOCD: CPT | Performed by: INTERNAL MEDICINE

## 2021-08-26 PROCEDURE — 1036F TOBACCO NON-USER: CPT | Performed by: INTERNAL MEDICINE

## 2021-08-26 RX ORDER — ATORVASTATIN CALCIUM 20 MG/1
20 TABLET, FILM COATED ORAL DAILY
Qty: 90 TABLET | Refills: 1 | Status: SHIPPED | OUTPATIENT
Start: 2021-08-26 | End: 2022-01-28

## 2021-08-26 RX ORDER — LISINOPRIL 10 MG/1
TABLET ORAL
COMMUNITY
Start: 2021-05-29

## 2021-08-26 RX ORDER — GABAPENTIN 100 MG/1
100 CAPSULE ORAL 3 TIMES DAILY
COMMUNITY

## 2021-08-26 NOTE — PROGRESS NOTES
[place] [address]   Cardiac Consultation    Referring Provider:  Hong Silva DO     Chief Complaint   Patient presents with    6 Month Follow-Up    Coronary Artery Disease    Hypertension        History of Present Illness:   Ms. Andrew Casey has a history of CAD s/p CABG 9/19/16. (LIMA to LAD, SVG to OM, and SVG to PDA).  Additional history includes hypertension, hyperlidemia, and carotid stenosis. She had MRA of the neck that revealed 80% JAVIER. In December, 2013 a carotid doppler revealed 50-70% JAVIER. At her August 2018 visit, she reported atypical chest pain with radiation to back, nonexertional, and occurred often after eating. Today, she reports she is not taking any sodium pills. She is no longer taking Lipitor- she reports this was stopped with her admission in May. She continue to be short of breath walking short distances, from the bathroom to the kitchen. Short of breath walking into the office today. She has chronic shortness of breath which has not changed since her last visit. She is chronically fatigued. She uses a walker. Past Medical History:   has a past medical history of Arthritis, Blood transfusion reaction, Carotid artery stenosis, Coronary artery disease due to lipid rich plaque, Hyperlipidemia, Hypertension, Nausea & vomiting, and Thyroid disease. Surgical History:   has a past surgical history that includes Tonsillectomy; hernia repair; Tubal ligation; Carotid endarterectomy (Right, 07/15/2014); Hysterectomy; Total knee arthroplasty (Bilateral); Total hip arthroplasty (Right); Cardiac catheterization (09/16/2016); and Coronary artery bypass graft (09/19/2016). Social History:   reports that she has never smoked. She has never used smokeless tobacco. She reports that she does not drink alcohol. Family History:  family history includes Heart Disease in her brother and mother.      Home Medications:  Prior to Admission medications    Medication Sig Start Date End Date Taking? Authorizing Provider   atorvastatin (LIPITOR) 20 MG tablet TAKE 1 TABLET BY MOUTH  DAILY 6/22/21   May Mendoza MD   acetaminophen (TYLENOL) 500 MG tablet Take 500 mg by mouth 3 times daily 3/30/21   Historical Provider, MD   vitamin D (CHOLECALCIFEROL) 25 MCG (1000 UT) TABS tablet Take 2,000 Units by mouth daily 3/31/21   Historical Provider, MD   senna-docusate (Laura Elva) 8.6-50 MG per tablet Take 1 tablet by mouth daily 3/31/21   Historical Provider, MD   sodium chloride 1 g tablet Take 1 g by mouth 2 times daily 3/30/21   Historical Provider, MD   tiZANidine (ZANAFLEX) 4 MG tablet Take 4 mg by mouth every 6 hours as needed 3/30/21   Historical Provider, MD   meclizine (ANTIVERT) 12.5 MG tablet Take 12.5 mg by mouth 3 times daily as needed    Historical Provider, MD   furosemide (LASIX) 20 MG tablet Take 40 mg by mouth daily  7/10/20   May Mendoza MD   aspirin 81 MG tablet Take 81 mg by mouth daily    Historical Provider, MD   levothyroxine (SYNTHROID) 125 MCG tablet Take 125 mcg by mouth daily  1/12/17   Historical Provider, MD   docusate sodium (COLACE, DULCOLAX) 100 MG CAPS Take 100 mg by mouth 2 times daily  Patient taking differently: Take 100 mg by mouth daily  9/23/16   ISIDORO Cochran CNP        Allergies:  Patient has no known allergies. Review of Systems:   · Constitutional: there has been no unanticipated weight loss. There's been no change in energy level, sleep pattern, or activity level. · Eyes: No visual changes or diplopia. No scleral icterus. · ENT: No Headaches, hearing loss or vertigo. No mouth sores or sore throat. · Cardiovascular: Reviewed in HPI  · Respiratory: No cough or wheezing, no sputum production. No hematemesis. +SOB   · Gastrointestinal: No abdominal pain, appetite loss, blood in stools. No change in bowel or bladder habits. · Genitourinary: No dysuria, trouble voiding, or hematuria.   · Musculoskeletal:  No gait disturbance, weakness or joint complaints. +walker   · Integumentary: No rash or pruritis. · Neurological: No headache, diplopia, change in muscle strength, numbness or tingling. No change in gait, balance, coordination, mood, affect, memory, mentation, behavior. · Psychiatric: No anxiety, no depression. · Endocrine: No malaise, fatigue or temperature intolerance. No excessive thirst, fluid intake, or urination. No tremor. · Hematologic/Lymphatic: No abnormal bruising or bleeding, blood clots or swollen lymph nodes. · Allergic/Immunologic: No nasal congestion or hives. Physical Examination:    Vitals:    08/26/21 1127   BP: 136/74   Pulse: 66   SpO2: 96%        Wt Readings from Last 1 Encounters:   08/26/21 220 lb (99.8 kg)       Constitutional and General Appearance: NAD   Skin:good turgor,intact without lesions  HEENT: EOMI ,normal  Neck:no JVD  Respiratory:  · Normal excursion and expansion without use of accessory muscles  · Resp Auscultation: Normal breath sounds without dullness  Cardiovascular:  · The apical impulses not displaced  · Heart tones are crisp and normal  · Cervical veins are not engorged  · The carotid upstroke is normal in amplitude and contour without delay or bruit  · Peripheral pulses are symmetrical and full  · There is no clubbing, cyanosis of the extremities.   · No edema  · Femoral Arteries: 2+ and equal  · Pedal Pulses: 2+ and equal   Abdomen:  · No masses or tenderness  · Liver/Spleen: No Abnormalities Noted  Neurological/Psychiatric:  · Alert and oriented in all spheres  · Moves all extremities well  · Exhibits normal gait balance and coordination  · No abnormalities of mood, affect, memory, mentation, or behavior are noted      Assessment:    Coronary artery disease due to lipid rich plaque   S/P CABG x 3 (9/19/16)  ~ 6/2018 Echo - EF 60-65% with normal wall motion  ~ 7/2019 Lexiscan myoview>  Apical scar.  No reversible myocardial ischemia.      Essential hypertension  ~ slight drop when standing   ~

## 2022-01-28 RX ORDER — ATORVASTATIN CALCIUM 20 MG/1
20 TABLET, FILM COATED ORAL DAILY
Qty: 90 TABLET | Refills: 3 | Status: SHIPPED | OUTPATIENT
Start: 2022-01-28

## 2022-01-28 NOTE — TELEPHONE ENCOUNTER
Received refill request for Atorvastatin from [facility] [facility].     Last ov: 08/25/021 LES    Last labs: 08/20/2021 Lipid, Enzymes (care everywhere)    Last Refill: 08/26/2021 #90 w/ 1 refill    Next appointment:  02/22/222